# Patient Record
Sex: FEMALE | Race: WHITE | NOT HISPANIC OR LATINO
[De-identification: names, ages, dates, MRNs, and addresses within clinical notes are randomized per-mention and may not be internally consistent; named-entity substitution may affect disease eponyms.]

---

## 2022-08-12 ENCOUNTER — APPOINTMENT (OUTPATIENT)
Dept: OBGYN | Facility: CLINIC | Age: 42
End: 2022-08-12

## 2022-08-12 VITALS
HEIGHT: 68 IN | DIASTOLIC BLOOD PRESSURE: 81 MMHG | SYSTOLIC BLOOD PRESSURE: 117 MMHG | BODY MASS INDEX: 22.43 KG/M2 | RESPIRATION RATE: 14 BRPM | WEIGHT: 148 LBS | HEART RATE: 72 BPM

## 2022-08-12 DIAGNOSIS — Z00.00 ENCOUNTER FOR GENERAL ADULT MEDICAL EXAMINATION W/OUT ABNORMAL FINDINGS: ICD-10-CM

## 2022-08-12 PROCEDURE — 99396 PREV VISIT EST AGE 40-64: CPT

## 2022-08-12 NOTE — HISTORY OF PRESENT ILLNESS
[FreeTextEntry1] : Patient is a 41-year-old  3 para 3 last menstrual period August sixth 2022\par Patient presents for semiannual visit,,, has history of heavy periods,,, had undergone a endometrial biopsy back in January with negative findings

## 2022-08-12 NOTE — PLAN
[FreeTextEntry1] : Patient is a 41-year-old  3 para 3 last menstrual period 2022\par Patient presents for annual visit,,, states her periods are still heavy\par Patient underwent a endometrial biopsy 2022 with negative findings\par Physical exam reveals a well-developed well-nourished female no apparent distress,,, BMI 21\par Heart regular rhythm and rate, lungs clear, breast no mass nontender no skin change no nipple discharge no adenopathy, abdomen soft nontender no organomegaly.\par Evidence of slight rotation of the spine in the thoracic area,,, patient states that she is aware that she has been seen by orthopedic surgeon and has been told according the patient that this is from muscular contraction\par Pelvic exam shows normal female external genitalia, vagina no lesions, cervix appropriate size nontender, uterus anteverted normal size nontender, adnexa no mass nontender.\par Pap smear performed\par Prescription for breast mammogram sonogram given for follow-up since patient does have history of breast cysts\par Discussed extensively options to control her cycles,,, OCPs versus Depo-Provera versus NuvaRing versus IUD versus surgical options\par Patient is leaning towards the IUD and the Mirena,,, and will reconsider\par Patient advised that if she does decide to go forward with Mirena IUD to use Cytotec preprocedure for less discomfort during insertion

## 2022-08-12 NOTE — PHYSICAL EXAM
[Chaperone Present] : A chaperone was present in the examining room during all aspects of the physical examination [Appropriately responsive] : appropriately responsive [Alert] : alert [No Acute Distress] : no acute distress [No Lymphadenopathy] : no lymphadenopathy [Regular Rate Rhythm] : regular rate rhythm [No Murmurs] : no murmurs [Clear to Auscultation B/L] : clear to auscultation bilaterally [Soft] : soft [Non-tender] : non-tender [Non-distended] : non-distended [No HSM] : No HSM [No Lesions] : no lesions [No Mass] : no mass [Examination Of The Breasts] : a normal appearance [No Masses] : no breast masses were palpable [Labia Majora] : normal [Labia Minora] : normal [Normal] : normal [Anteversion] : anteverted [Uterine Adnexae] : normal [FreeTextEntry6] : No masses, nontender, no skin changes, no nipple discharge, no adenopathy [Tenderness] : nontender [Mass ___ cm] : no uterine mass was palpated

## 2022-08-18 LAB — HPV HIGH+LOW RISK DNA PNL CVX: NOT DETECTED

## 2022-08-22 ENCOUNTER — APPOINTMENT (OUTPATIENT)
Dept: PULMONOLOGY | Facility: CLINIC | Age: 42
End: 2022-08-22

## 2022-08-22 VITALS
OXYGEN SATURATION: 98 % | RESPIRATION RATE: 16 BRPM | WEIGHT: 148 LBS | HEIGHT: 68 IN | HEART RATE: 74 BPM | BODY MASS INDEX: 22.43 KG/M2

## 2022-08-22 VITALS — SYSTOLIC BLOOD PRESSURE: 102 MMHG | DIASTOLIC BLOOD PRESSURE: 68 MMHG

## 2022-08-22 DIAGNOSIS — K21.9 GASTRO-ESOPHAGEAL REFLUX DISEASE W/OUT ESOPHAGITIS: ICD-10-CM

## 2022-08-22 DIAGNOSIS — U07.1 COVID-19: ICD-10-CM

## 2022-08-22 DIAGNOSIS — R06.02 SHORTNESS OF BREATH: ICD-10-CM

## 2022-08-22 PROCEDURE — 99204 OFFICE O/P NEW MOD 45 MIN: CPT

## 2022-08-22 RX ORDER — LINACLOTIDE 72 UG/1
72 CAPSULE, GELATIN COATED ORAL
Qty: 30 | Refills: 0 | Status: ACTIVE | COMMUNITY
Start: 2021-07-22

## 2022-08-22 NOTE — DISCUSSION/SUMMARY
[FreeTextEntry1] : \par #1. Will schedule PFTs in near future to assess lung function \par #2. The patient does not appear to require chronic BD therapy at this time\par #3. Diet and exercise for weight loss\par #4. Albuterol as needed\par #5. CXR to evaluate SOBOE \par #6. Flonase nasal spray for post nasal drip as needed \par #7. PPI for GERD if needed\par #8. Allergy meds as needed\par #9. Pt s/p Covid infection; unvaccinated\par #10. F/u in 4-6 weeks with CXR and PFTs\par #11. Reviewed risks of exposure and symptoms of Covid-19 virus, including how the virus is spread and precautions to avoid evelyn virus. \par \par The patient expressed understanding and agreement with the above recommendations/plan and accepts responsibility to be compliant with recommended testing, therapies, and f/u visits.\par All relevant questions and concerns were addressed.

## 2022-08-22 NOTE — PHYSICAL EXAM
[No Acute Distress] : no acute distress [Well Nourished] : well nourished [Well Developed] : well developed [Normal Oropharynx] : normal oropharynx [Normal Appearance] : normal appearance [Supple] : supple [Normal Rate/Rhythm] : normal rate/rhythm [Normal S1, S2] : normal s1, s2 [No Murmurs] : no murmurs [No Resp Distress] : no resp distress [No Acc Muscle Use] : no acc muscle use [Normal Rhythm and Effort] : normal rhythm and effort [Clear to Auscultation Bilaterally] : clear to auscultation bilaterally [No Abnormalities] : no abnormalities [Benign] : benign [Not Tender] : not tender [Soft] : soft [No Clubbing] : no clubbing [No Edema] : no edema [No Focal Deficits] : no focal deficits [Oriented x3] : oriented x3

## 2022-08-22 NOTE — CONSULT LETTER
[Dear  ___] : Dear  [unfilled], [Consult Letter:] : I had the pleasure of evaluating your patient, [unfilled]. [Please see my note below.] : Please see my note below. [Consult Closing:] : Thank you very much for allowing me to participate in the care of this patient.  If you have any questions, please do not hesitate to contact me. [Sincerely,] : Sincerely, [FreeTextEntry3] : Josesito Lafleur MD, FCCP, D. ABSM\par Pulmonary and Sleep Medicine\par [facility] at [place]

## 2022-08-22 NOTE — REVIEW OF SYSTEMS
[Postnasal Drip] : postnasal drip [Chest Tightness] : chest tightness [SOB on Exertion] : sob on exertion [Negative] : Endocrine

## 2022-08-22 NOTE — HISTORY OF PRESENT ILLNESS
[Never] : never [TextBox_4] : Patient c/o SOBOE but is otherwise without associated respiratory complaints. \par Pt s/p Covid infection in 12/2021 when she developed fatigue, cough, h/a and resolved spontaneously. She did not require therapy.\par Pt with chronic fatigue and fibromyalgia.\par  [Initial Eval - Existing Diagnosis] : an initial evaluation of an existing diagnosis of [Intermittent] : intermittent [Dyspnea on Exertion] : dyspnea on exertion [Currently Experiencing] : The patient is currently experiencing symptoms. [None] : No associated symptoms are reported [Inhaled Short-Acting Beta-2 Agonists] : inhaled short-acting beta-2 agonists [Good Compliance] : good compliance with treatment [Good Tolerance] : good tolerance of treatment [Fair Symptom Control] : fair symptom control

## 2022-10-25 ENCOUNTER — APPOINTMENT (OUTPATIENT)
Dept: PULMONOLOGY | Facility: CLINIC | Age: 42
End: 2022-10-25

## 2022-10-25 VITALS — HEIGHT: 67 IN | BODY MASS INDEX: 22.6 KG/M2 | WEIGHT: 144 LBS

## 2022-10-25 VITALS
HEART RATE: 78 BPM | DIASTOLIC BLOOD PRESSURE: 76 MMHG | RESPIRATION RATE: 16 BRPM | OXYGEN SATURATION: 99 % | SYSTOLIC BLOOD PRESSURE: 116 MMHG

## 2022-10-25 PROCEDURE — 85018 HEMOGLOBIN: CPT | Mod: QW

## 2022-10-25 PROCEDURE — 94010 BREATHING CAPACITY TEST: CPT

## 2022-10-25 PROCEDURE — 99214 OFFICE O/P EST MOD 30 MIN: CPT | Mod: 25

## 2022-10-25 PROCEDURE — 94727 GAS DIL/WSHOT DETER LNG VOL: CPT

## 2022-10-25 PROCEDURE — 94729 DIFFUSING CAPACITY: CPT

## 2022-10-25 NOTE — CONSULT LETTER
[Dear  ___] : Dear  [unfilled], [Consult Letter:] : I had the pleasure of evaluating your patient, [unfilled]. [Please see my note below.] : Please see my note below. [Consult Closing:] : Thank you very much for allowing me to participate in the care of this patient.  If you have any questions, please do not hesitate to contact me. [Sincerely,] : Sincerely, [FreeTextEntry3] : Josesito Lafleur MD, FCCP, D. ABSM\par Pulmonary and Sleep Medicine\par [facility] at [place]

## 2022-10-25 NOTE — DISCUSSION/SUMMARY
[FreeTextEntry1] : \par #1. PFTs performed today are essentially normal.\par #2. The patient does not appear to require chronic BD therapy at this time\par #3. SOBOE is likely at least somewhat related to weight or deconditioning given PFT findings\par #4. Diet and exercise for weight loss \par #5. CXR to evaluate SOBOE from 9/14/22 was clear\par #6. Flonase nasal spray for post nasal drip as needed \par #7. PPI for GERD if needed\par #8. Allergy meds as needed\par #9. Pt s/p Covid infection; unvaccinated\par #10. Methacholine challenge test to further evaluate for possible asthma \par #11. CBC with eos and IgE levels to further evaluate for asthma\par #12. F/u in 2 months with MCT and labs\par #13. ENT f/u \par #14. Reviewed risks of exposure and symptoms of Covid-19 virus, including how the virus is spread and precautions to avoid evelyn virus. \par \par The patient expressed understanding and agreement with the above recommendations/plan and accepts responsibility to be compliant with recommended testing, therapies, and f/u visits.\par All relevant questions and concerns were addressed.

## 2022-10-25 NOTE — HISTORY OF PRESENT ILLNESS
[Never] : never [Intermittent] : intermittent [Dyspnea on Exertion] : dyspnea on exertion [Currently Experiencing] : The patient is currently experiencing symptoms. [None] : No associated symptoms are reported [Inhaled Short-Acting Beta-2 Agonists] : inhaled short-acting beta-2 agonists [Good Compliance] : good compliance with treatment [Good Tolerance] : good tolerance of treatment [Fair Symptom Control] : fair symptom control [Follow-Up - Routine Clinic] : a routine clinic follow-up of [TextBox_4] : Patient c/o SOBOE but is otherwise without associated respiratory complaints. \par Pt s/p Covid infection in 12/2021 when she developed fatigue, cough, h/a and resolved spontaneously. She did not require therapy.\par Pt with chronic fatigue and fibromyalgia.\par She reports asthma as a child, responsive to Albuterol inhaler therapy. She uses her Albuterol now as well with some improvement but without resolution of symptoms though does use prior to exercise at times.\par Seen by ENT but w/u non-diagnostic; concern for possible anxiety as contributing factor

## 2022-12-28 ENCOUNTER — APPOINTMENT (OUTPATIENT)
Dept: PULMONOLOGY | Facility: CLINIC | Age: 42
End: 2022-12-28

## 2023-04-03 ENCOUNTER — APPOINTMENT (OUTPATIENT)
Dept: PULMONOLOGY | Facility: CLINIC | Age: 43
End: 2023-04-03
Payer: COMMERCIAL

## 2023-04-03 VITALS
WEIGHT: 140 LBS | SYSTOLIC BLOOD PRESSURE: 120 MMHG | OXYGEN SATURATION: 99 % | DIASTOLIC BLOOD PRESSURE: 80 MMHG | BODY MASS INDEX: 21.22 KG/M2 | HEART RATE: 88 BPM | RESPIRATION RATE: 16 BRPM | HEIGHT: 68 IN

## 2023-04-03 PROCEDURE — 99214 OFFICE O/P EST MOD 30 MIN: CPT

## 2023-04-03 RX ORDER — AZITHROMYCIN 500 MG/1
500 TABLET, FILM COATED ORAL
Qty: 7 | Refills: 0 | Status: DISCONTINUED | COMMUNITY
Start: 2022-10-17 | End: 2023-04-03

## 2023-04-03 RX ORDER — LEVOCETIRIZINE DIHYDROCHLORIDE 5 MG/1
5 TABLET ORAL
Qty: 21 | Refills: 0 | Status: DISCONTINUED | COMMUNITY
Start: 2023-03-29

## 2023-04-03 RX ORDER — OMEPRAZOLE 20 MG/1
20 CAPSULE, DELAYED RELEASE ORAL
Qty: 30 | Refills: 0 | Status: DISCONTINUED | COMMUNITY
Start: 2022-03-21 | End: 2023-04-03

## 2023-04-03 RX ORDER — FLUTICASONE PROPIONATE 50 UG/1
50 SPRAY, METERED NASAL
Qty: 16 | Refills: 0 | Status: DISCONTINUED | COMMUNITY
Start: 2022-03-21 | End: 2023-04-03

## 2023-04-03 RX ORDER — AMITRIPTYLINE HYDROCHLORIDE 10 MG/1
10 TABLET, FILM COATED ORAL
Qty: 30 | Refills: 0 | Status: DISCONTINUED | COMMUNITY
Start: 2023-01-08

## 2023-04-03 NOTE — HISTORY OF PRESENT ILLNESS
[Follow-Up - Routine Clinic] : a routine clinic follow-up of [Intermittent] : intermittent [Dyspnea on Exertion] : dyspnea on exertion [Currently Experiencing] : The patient is currently experiencing symptoms. [None] : No associated symptoms are reported [Inhaled Short-Acting Beta-2 Agonists] : inhaled short-acting beta-2 agonists [Good Compliance] : good compliance with treatment [Good Tolerance] : good tolerance of treatment [Fair Symptom Control] : fair symptom control [TextBox_4] : Patient c/o SOBOE but is otherwise without associated respiratory complaints. \par Pt s/p Covid infection in 12/2021 when she developed fatigue, cough, h/a and resolved spontaneously. She did not require therapy.\par Pt with chronic fatigue and fibromyalgia.\par She reports asthma as a child, responsive to Albuterol inhaler therapy. She uses her Albuterol now as well with some improvement but without resolution of symptoms though does use prior to exercise at times.\par Seen by ENT but w/u non-diagnostic; concern for possible anxiety as contributing factor.\par

## 2023-04-03 NOTE — CONSULT LETTER
[Dear  ___] : Dear  [unfilled], [Consult Letter:] : I had the pleasure of evaluating your patient, [unfilled]. [Please see my note below.] : Please see my note below. [Consult Closing:] : Thank you very much for allowing me to participate in the care of this patient.  If you have any questions, please do not hesitate to contact me. [Sincerely,] : Sincerely, [FreeTextEntry3] : Josesito Lafleur MD, FCCP, D. ABSM\par Pulmonary and Sleep Medicine\par [facility] at [place]

## 2023-04-03 NOTE — DISCUSSION/SUMMARY
[FreeTextEntry1] : \par #1. PFTs performed previously essentially normal.\par #2. The patient does not appear to require chronic BD therapy at this time\par #3. SOBOE is likely at least somewhat related to weight or deconditioning given PFT findings\par #4. Diet and exercise for weight loss \par #5. CXR to evaluate SOBOE from 9/14/22 was clear\par #6. Flonase nasal spray for post nasal drip as needed \par #7. PPI for GERD if needed\par #8. Allergy meds as needed\par #9. Pt s/p Covid infection; unvaccinated\par #10. Methacholine challenge test to further evaluate for possible asthma was negative\par #11. CBC with eos and IgE levels to further evaluate for asthma was not done so ordered again\par #12. Medrol dose meron for recent sinusitis; ENT f/u\par #13. Trial of Breo 100 given exertional SOB with exercise though PFTs and MCT were negative; she reports some improvement with Albuterol inhaler as needed\par #14. Reviewed risks of exposure and symptoms of Covid-19 virus, including how the virus is spread and precautions to avoid evelyn virus. \par \par The patient expressed understanding and agreement with the above recommendations/plan and accepts responsibility to be compliant with recommended testing, therapies, and f/u visits.\par All relevant questions and concerns were addressed.

## 2023-07-06 ENCOUNTER — APPOINTMENT (OUTPATIENT)
Dept: PULMONOLOGY | Facility: CLINIC | Age: 43
End: 2023-07-06
Payer: COMMERCIAL

## 2023-07-06 VITALS
SYSTOLIC BLOOD PRESSURE: 122 MMHG | DIASTOLIC BLOOD PRESSURE: 78 MMHG | HEIGHT: 68 IN | BODY MASS INDEX: 21.22 KG/M2 | HEART RATE: 87 BPM | RESPIRATION RATE: 16 BRPM | OXYGEN SATURATION: 98 % | WEIGHT: 140 LBS

## 2023-07-06 DIAGNOSIS — J45.990 EXERCISE INDUCED BRONCHOSPASM: ICD-10-CM

## 2023-07-06 DIAGNOSIS — R06.02 SHORTNESS OF BREATH: ICD-10-CM

## 2023-07-06 DIAGNOSIS — R09.82 POSTNASAL DRIP: ICD-10-CM

## 2023-07-06 DIAGNOSIS — R05.9 COUGH, UNSPECIFIED: ICD-10-CM

## 2023-07-06 PROCEDURE — 99214 OFFICE O/P EST MOD 30 MIN: CPT

## 2023-07-06 RX ORDER — METHYLPREDNISOLONE 4 MG/1
4 TABLET ORAL
Qty: 1 | Refills: 0 | Status: DISCONTINUED | COMMUNITY
Start: 2023-04-03 | End: 2023-07-06

## 2023-07-06 RX ORDER — AMOXICILLIN AND CLAVULANATE POTASSIUM 875; 125 MG/1; MG/1
875-125 TABLET, COATED ORAL
Qty: 20 | Refills: 0 | Status: DISCONTINUED | COMMUNITY
Start: 2022-10-08 | End: 2023-07-06

## 2023-07-06 NOTE — CONSULT LETTER
[Dear  ___] : Dear  [unfilled], [Consult Letter:] : I had the pleasure of evaluating your patient, [unfilled]. [Please see my note below.] : Please see my note below. [Consult Closing:] : Thank you very much for allowing me to participate in the care of this patient.  If you have any questions, please do not hesitate to contact me. [Sincerely,] : Sincerely, [FreeTextEntry3] : Josesito Lafleur MD, FCCP, D. ABSM\par Pulmonary and Sleep Medicine\par [facility] at [place]

## 2023-07-06 NOTE — DISCUSSION/SUMMARY
[FreeTextEntry1] : \par #1. PFTs performed previously essentially normal.\par #2. The patient does not appear to require chronic BD therapy at this time\par #3. SOBOE is likely at least somewhat related to weight or deconditioning given PFT findings\par #4. Diet and exercise for weight loss \par #5. CXR to evaluate SOBOE from 9/14/22 was clear\par #6. Flonase nasal spray for post nasal drip as needed \par #7. PPI for GERD if needed\par #8. Allergy meds as needed\par #9. Pt s/p Covid infection\par #10. Methacholine challenge test to further evaluate for possible asthma was negative\par #11. CBC with eos and IgE levels to further evaluate for asthma was not done so ordered again\par #12. Medrol dose meron for recent sinusitis in the past; ENT f/u if nasal symptoms persist\par #13. Doing well with Breo 100 given exertional SOB with exercise though PFTs and MCT were negative; she reports some improvement and rarely uses the Albuterol inhaler as needed\par \par The patient expressed understanding and agreement with the above recommendations/plan and accepts responsibility to be compliant with recommended testing, therapies, and f/u visits.\par All relevant questions and concerns were addressed.

## 2023-08-18 ENCOUNTER — APPOINTMENT (OUTPATIENT)
Dept: OBGYN | Facility: CLINIC | Age: 43
End: 2023-08-18
Payer: COMMERCIAL

## 2023-08-18 VITALS
HEART RATE: 73 BPM | SYSTOLIC BLOOD PRESSURE: 127 MMHG | DIASTOLIC BLOOD PRESSURE: 86 MMHG | WEIGHT: 150 LBS | BODY MASS INDEX: 22.81 KG/M2

## 2023-08-18 DIAGNOSIS — R92.2 INCONCLUSIVE MAMMOGRAM: ICD-10-CM

## 2023-08-18 DIAGNOSIS — Z01.419 ENCOUNTER FOR GYNECOLOGICAL EXAMINATION (GENERAL) (ROUTINE) W/OUT ABNORMAL FINDINGS: ICD-10-CM

## 2023-08-18 DIAGNOSIS — Z12.4 ENCOUNTER FOR SCREENING FOR MALIGNANT NEOPLASM OF CERVIX: ICD-10-CM

## 2023-08-18 DIAGNOSIS — Z12.39 ENCOUNTER FOR OTHER SCREENING FOR MALIGNANT NEOPLASM OF BREAST: ICD-10-CM

## 2023-08-18 DIAGNOSIS — Z12.31 ENCOUNTER FOR SCREENING MAMMOGRAM FOR MALIGNANT NEOPLASM OF BREAST: ICD-10-CM

## 2023-08-18 DIAGNOSIS — Z11.51 ENCOUNTER FOR SCREENING FOR HUMAN PAPILLOMAVIRUS (HPV): ICD-10-CM

## 2023-08-18 PROCEDURE — 99396 PREV VISIT EST AGE 40-64: CPT

## 2023-08-18 RX ORDER — NORETHINDRONE ACETATE AND ETHINYL ESTRADIOL, ETHINYL ESTRADIOL AND FERROUS FUMARATE 1MG-10(24)
1 MG-10 MCG / KIT ORAL
Qty: 84 | Refills: 3 | Status: ACTIVE | COMMUNITY
Start: 2023-08-18 | End: 1900-01-01

## 2023-08-18 NOTE — PLAN
[FreeTextEntry1] : Patient is a 42-year-old  3 para 3 last menstrual 2023 Patient presents for annual visit,, denies any complaints Physical exam reveals a well-developed well-nourished female no apparent distress,, BMI 22 Heart regular rhythm and rate, lungs clear, breast no mass nontender no skin change or nipple discharge no adenopathy, abdomen soft nontender no organomegaly. Pelvic exam shows normal female external genitalia, vagina no lesions, cervix appropriate size nontender, uterus anteverted normal size nontender, adnexa no mass nontender. Pap smear was performed Patient will be given a prescription for breast mammogram and sonogram Patient also will have renewal on her birth control pills Follow-up 6 months Essentially benign joint exam  Georgiana was present for the entire assessment and examination of this patient

## 2023-08-18 NOTE — HISTORY OF PRESENT ILLNESS
[FreeTextEntry1] : Patient is a 42-year-old  3 para 3 last menstrual 2023 Patient presents for annual visit,, and renewal her birth control pills

## 2023-08-24 RX ORDER — NORETHINDRONE ACETATE AND ETHINYL ESTRADIOL, ETHINYL ESTRADIOL AND FERROUS FUMARATE 1MG-10(24)
1 MG-10 MCG / KIT ORAL
Qty: 84 | Refills: 3 | Status: ACTIVE | COMMUNITY
Start: 2022-10-11 | End: 1900-01-01

## 2023-08-25 LAB — HPV HIGH+LOW RISK DNA PNL CVX: NOT DETECTED

## 2023-11-15 ENCOUNTER — APPOINTMENT (OUTPATIENT)
Dept: PULMONOLOGY | Facility: CLINIC | Age: 43
End: 2023-11-15

## 2024-01-15 RX ORDER — FLUTICASONE FUROATE AND VILANTEROL TRIFENATATE 100; 25 UG/1; UG/1
100-25 POWDER RESPIRATORY (INHALATION)
Qty: 1 | Refills: 3 | Status: ACTIVE | COMMUNITY
Start: 2023-04-03 | End: 1900-01-01

## 2024-02-21 ENCOUNTER — NON-APPOINTMENT (OUTPATIENT)
Age: 44
End: 2024-02-21

## 2024-03-29 ENCOUNTER — NON-APPOINTMENT (OUTPATIENT)
Age: 44
End: 2024-03-29

## 2024-03-29 ENCOUNTER — RESULT REVIEW (OUTPATIENT)
Age: 44
End: 2024-03-29

## 2024-06-12 ENCOUNTER — APPOINTMENT (OUTPATIENT)
Dept: BREAST CENTER | Facility: CLINIC | Age: 44
End: 2024-06-12
Payer: COMMERCIAL

## 2024-06-12 VITALS
DIASTOLIC BLOOD PRESSURE: 77 MMHG | HEIGHT: 68 IN | HEART RATE: 76 BPM | SYSTOLIC BLOOD PRESSURE: 113 MMHG | WEIGHT: 136 LBS | BODY MASS INDEX: 20.61 KG/M2

## 2024-06-12 DIAGNOSIS — R92.8 OTHER ABNORMAL AND INCONCLUSIVE FINDINGS ON DIAGNOSTIC IMAGING OF BREAST: ICD-10-CM

## 2024-06-12 DIAGNOSIS — Z78.9 OTHER SPECIFIED HEALTH STATUS: ICD-10-CM

## 2024-06-12 DIAGNOSIS — R89.7 ABNORMAL HISTOLOGICAL FINDINGS IN SPECIMENS FROM OTHER ORGANS, SYSTEMS AND TISSUES: ICD-10-CM

## 2024-06-12 PROCEDURE — 99244 OFF/OP CNSLTJ NEW/EST MOD 40: CPT

## 2024-06-12 RX ORDER — ALBUTEROL SULFATE 90 UG/1
108 (90 BASE) INHALANT RESPIRATORY (INHALATION)
Qty: 1 | Refills: 2 | Status: DISCONTINUED | COMMUNITY
Start: 2021-09-02 | End: 2024-06-12

## 2024-06-12 NOTE — PHYSICAL EXAM
[Normocephalic] : normocephalic [EOMI] : extra ocular movement intact [Sclera nonicteric] : sclera nonicteric [Supple] : supple [No Supraclavicular Adenopathy] : no supraclavicular adenopathy [No Cervical Adenopathy] : no cervical adenopathy [No Thyromegaly] : no thyromegaly [Clear to Auscultation Bilat] : clear to auscultation bilaterally [Normal Sinus Rhythm] : normal sinus rhythm [Normal S1, S2] : normal S1 and S2 [Examined in the supine and seated position] : examined in the supine and seated position [Bra Size: ___] : Bra Size: [unfilled] [No dominant masses] : no dominant masses in right breast  [No Nipple Retraction] : no left nipple retraction [No Nipple Discharge] : no left nipple discharge [No Axillary Lymphadenopathy] : no left axillary lymphadenopathy [Soft] : abdomen soft [Not Tender] : non-tender [No Palpable Masses] : no abdominal mass palpated [de-identified] : 2 cm smooth defined mobile mass 12N4

## 2024-06-12 NOTE — PROCEDURE
[FreeTextEntry1] : Left breast ultrasound [FreeTextEntry2] : Assess palpable finding [FreeTextEntry3] : The left 12N4 breast shows an oval [id number] mm hypoechoic mass with clip.

## 2024-06-12 NOTE — CONSULT LETTER
[Dear  ___] : Dear  [unfilled], [Consult Letter:] : I had the pleasure of evaluating your patient, [unfilled]. [Sincerely,] : Sincerely, [DrAlecia  ___] : Dr. GLEZ

## 2024-06-12 NOTE — DATA REVIEWED
[FreeTextEntry1] : Mammogram screening bilateral 2/21/2024: Breasts are heterogenously dense. No suspicious masses , architectural distortion, or significant calcifications are detected.  There is a tissue marker in the superior aspect of the left breast from prior benign percutaneous biopsy.  It is associated with an oval mass.  The area of mass formation appears larger on the current study.  Bilateral Breast Ultrasound 2/21/2024: In the 12:00 region of the left breast , 5 cm FN there is a [id number] heterogeneous mass .  This is in the region previously biopsied and was found to be a fibroepithelial lesion.  Surgical consultation was advised after those patholgoy results were received.  This structure has enlarged since the prior study and ultrasound - guided biopsy is recommended.   Ultrasound guided core biopsy of left breast 3/29/2024: left breast at 12:00, fibroepithelial lesion . Pathology findings are similar to previous biopsy of this lesion 10/5/22) and are concordant with imaging findings.    Pathology 3/29/2024: left breast 12:00 biopsy:  Fibroepithelial lesion .  Differential diagnosis include fibroadenoma vs benign phyllodes tumor.  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  Complete excision of the lesion is recommended. ([place] X shaped biopsy clip)  (images from [facility] were uploaded/reviewed on PACS - discs returned to patient)

## 2024-06-12 NOTE — PAST MEDICAL HISTORY
[Menstruating] : The patient is menstruating [Menarche Age ____] : age at menarche was [unfilled] [Definite ___ (Date)] : the last menstrual period was [unfilled] [Total Preg ___] : G[unfilled] [Live Births ___] : P[unfilled]  [Age At Live Birth ___] : Age at live birth: [unfilled] [History of Hormone Replacement Treatment] : has no history of hormone replacement treatment [FreeTextEntry2] : 2 sons, daughter [FreeTextEntry7] : yes [FreeTextEntry8] : 2/3 months for each.

## 2024-06-12 NOTE — HISTORY OF PRESENT ILLNESS
[FreeTextEntry1] : This is a 43 year old  female who was found to have a left breast mass at 12N5 on routine imaging in 2/22. It increased on follow-up and a core biopsy was done in 10/22 that showed a fibroepithelial lesion.  She saw a surgeon who advised that it be monitored.  She had breast imaging in 2/24 that showed the mass to have increased further.  Another core biopsy was done and again shows a fibroepithelial lesion. She was advised to have it removed.  She is able to feel the mass.   Risk assessment:  TCv8 5 year= 0.9%  Lifetime= 11.8%                                Diane model 5 year= 1.3%  Lifetime= 12.9%

## 2024-07-01 ENCOUNTER — OUTPATIENT (OUTPATIENT)
Dept: OUTPATIENT SERVICES | Facility: HOSPITAL | Age: 44
LOS: 1 days | End: 2024-07-01
Payer: COMMERCIAL

## 2024-07-01 DIAGNOSIS — Z01.818 ENCOUNTER FOR OTHER PREPROCEDURAL EXAMINATION: ICD-10-CM

## 2024-07-01 DIAGNOSIS — R92.8 OTHER ABNORMAL AND INCONCLUSIVE FINDINGS ON DIAGNOSTIC IMAGING OF BREAST: ICD-10-CM

## 2024-07-01 DIAGNOSIS — Z98.890 OTHER SPECIFIED POSTPROCEDURAL STATES: Chronic | ICD-10-CM

## 2024-07-01 LAB
ABO RH CONFIRMATION: SIGNIFICANT CHANGE UP
ALBUMIN SERPL ELPH-MCNC: 3.9 G/DL — SIGNIFICANT CHANGE UP (ref 3.3–5)
ALP SERPL-CCNC: 58 U/L — SIGNIFICANT CHANGE UP (ref 40–120)
ALT FLD-CCNC: 28 U/L — SIGNIFICANT CHANGE UP (ref 12–78)
ANION GAP SERPL CALC-SCNC: 2 MMOL/L — LOW (ref 5–17)
APTT BLD: 25.5 SEC — SIGNIFICANT CHANGE UP (ref 24.5–35.6)
AST SERPL-CCNC: 22 U/L — SIGNIFICANT CHANGE UP (ref 15–37)
BASOPHILS # BLD AUTO: 0.04 K/UL — SIGNIFICANT CHANGE UP (ref 0–0.2)
BASOPHILS NFR BLD AUTO: 0.6 % — SIGNIFICANT CHANGE UP (ref 0–2)
BILIRUB SERPL-MCNC: 1.4 MG/DL — HIGH (ref 0.2–1.2)
BUN SERPL-MCNC: 12 MG/DL — SIGNIFICANT CHANGE UP (ref 7–23)
CALCIUM SERPL-MCNC: 9.2 MG/DL — SIGNIFICANT CHANGE UP (ref 8.5–10.1)
CHLORIDE SERPL-SCNC: 111 MMOL/L — HIGH (ref 96–108)
CO2 SERPL-SCNC: 25 MMOL/L — SIGNIFICANT CHANGE UP (ref 22–31)
CREAT SERPL-MCNC: 0.88 MG/DL — SIGNIFICANT CHANGE UP (ref 0.5–1.3)
EGFR: 83 ML/MIN/1.73M2 — SIGNIFICANT CHANGE UP
EOSINOPHIL # BLD AUTO: 0.07 K/UL — SIGNIFICANT CHANGE UP (ref 0–0.5)
EOSINOPHIL NFR BLD AUTO: 1 % — SIGNIFICANT CHANGE UP (ref 0–6)
GLUCOSE SERPL-MCNC: 96 MG/DL — SIGNIFICANT CHANGE UP (ref 70–99)
HCT VFR BLD CALC: 41.4 % — SIGNIFICANT CHANGE UP (ref 34.5–45)
HGB BLD-MCNC: 14.2 G/DL — SIGNIFICANT CHANGE UP (ref 11.5–15.5)
IMM GRANULOCYTES NFR BLD AUTO: 0.1 % — SIGNIFICANT CHANGE UP (ref 0–0.9)
INR BLD: 0.87 RATIO — SIGNIFICANT CHANGE UP (ref 0.85–1.18)
LYMPHOCYTES # BLD AUTO: 2.13 K/UL — SIGNIFICANT CHANGE UP (ref 1–3.3)
LYMPHOCYTES # BLD AUTO: 31.3 % — SIGNIFICANT CHANGE UP (ref 13–44)
MCHC RBC-ENTMCNC: 32.3 PG — SIGNIFICANT CHANGE UP (ref 27–34)
MCHC RBC-ENTMCNC: 34.3 GM/DL — SIGNIFICANT CHANGE UP (ref 32–36)
MCV RBC AUTO: 94.1 FL — SIGNIFICANT CHANGE UP (ref 80–100)
MONOCYTES # BLD AUTO: 0.72 K/UL — SIGNIFICANT CHANGE UP (ref 0–0.9)
MONOCYTES NFR BLD AUTO: 10.6 % — SIGNIFICANT CHANGE UP (ref 2–14)
NEUTROPHILS # BLD AUTO: 3.84 K/UL — SIGNIFICANT CHANGE UP (ref 1.8–7.4)
NEUTROPHILS NFR BLD AUTO: 56.4 % — SIGNIFICANT CHANGE UP (ref 43–77)
PLATELET # BLD AUTO: 292 K/UL — SIGNIFICANT CHANGE UP (ref 150–400)
POTASSIUM SERPL-MCNC: 4.2 MMOL/L — SIGNIFICANT CHANGE UP (ref 3.5–5.3)
POTASSIUM SERPL-SCNC: 4.2 MMOL/L — SIGNIFICANT CHANGE UP (ref 3.5–5.3)
PROT SERPL-MCNC: 7.2 GM/DL — SIGNIFICANT CHANGE UP (ref 6–8.3)
PROTHROM AB SERPL-ACNC: 9.9 SEC — SIGNIFICANT CHANGE UP (ref 9.5–13)
RBC # BLD: 4.4 M/UL — SIGNIFICANT CHANGE UP (ref 3.8–5.2)
RBC # FLD: 12.6 % — SIGNIFICANT CHANGE UP (ref 10.3–14.5)
SODIUM SERPL-SCNC: 138 MMOL/L — SIGNIFICANT CHANGE UP (ref 135–145)
WBC # BLD: 6.81 K/UL — SIGNIFICANT CHANGE UP (ref 3.8–10.5)
WBC # FLD AUTO: 6.81 K/UL — SIGNIFICANT CHANGE UP (ref 3.8–10.5)

## 2024-07-01 PROCEDURE — 85730 THROMBOPLASTIN TIME PARTIAL: CPT

## 2024-07-01 PROCEDURE — 36415 COLL VENOUS BLD VENIPUNCTURE: CPT

## 2024-07-01 PROCEDURE — 71046 X-RAY EXAM CHEST 2 VIEWS: CPT

## 2024-07-01 PROCEDURE — 85025 COMPLETE CBC W/AUTO DIFF WBC: CPT

## 2024-07-01 PROCEDURE — 93010 ELECTROCARDIOGRAM REPORT: CPT

## 2024-07-01 PROCEDURE — 85610 PROTHROMBIN TIME: CPT

## 2024-07-01 PROCEDURE — 86850 RBC ANTIBODY SCREEN: CPT

## 2024-07-01 PROCEDURE — 93005 ELECTROCARDIOGRAM TRACING: CPT

## 2024-07-01 PROCEDURE — 86900 BLOOD TYPING SEROLOGIC ABO: CPT

## 2024-07-01 PROCEDURE — 86901 BLOOD TYPING SEROLOGIC RH(D): CPT

## 2024-07-01 PROCEDURE — 80053 COMPREHEN METABOLIC PANEL: CPT

## 2024-07-01 PROCEDURE — 71046 X-RAY EXAM CHEST 2 VIEWS: CPT | Mod: 26

## 2024-07-02 DIAGNOSIS — Z01.818 ENCOUNTER FOR OTHER PREPROCEDURAL EXAMINATION: ICD-10-CM

## 2024-07-02 DIAGNOSIS — R92.8 OTHER ABNORMAL AND INCONCLUSIVE FINDINGS ON DIAGNOSTIC IMAGING OF BREAST: ICD-10-CM

## 2024-07-03 ENCOUNTER — OUTPATIENT (OUTPATIENT)
Dept: OUTPATIENT SERVICES | Facility: HOSPITAL | Age: 44
LOS: 1 days | End: 2024-07-03
Payer: COMMERCIAL

## 2024-07-03 ENCOUNTER — RESULT REVIEW (OUTPATIENT)
Age: 44
End: 2024-07-03

## 2024-07-03 DIAGNOSIS — R92.8 OTHER ABNORMAL AND INCONCLUSIVE FINDINGS ON DIAGNOSTIC IMAGING OF BREAST: ICD-10-CM

## 2024-07-03 DIAGNOSIS — R89.7 ABNORMAL HISTOLOGICAL FINDINGS IN SPECIMENS FROM OTHER ORGANS, SYSTEMS AND TISSUES: ICD-10-CM

## 2024-07-03 DIAGNOSIS — Z98.890 OTHER SPECIFIED POSTPROCEDURAL STATES: Chronic | ICD-10-CM

## 2024-07-03 PROBLEM — N63.20 UNSPECIFIED LUMP IN THE LEFT BREAST, UNSPECIFIED QUADRANT: Chronic | Status: ACTIVE | Noted: 2024-07-01

## 2024-07-03 PROCEDURE — 88321 CONSLTJ&REPRT SLD PREP ELSWR: CPT

## 2024-07-04 DIAGNOSIS — R92.8 OTHER ABNORMAL AND INCONCLUSIVE FINDINGS ON DIAGNOSTIC IMAGING OF BREAST: ICD-10-CM

## 2024-07-04 DIAGNOSIS — R89.7 ABNORMAL HISTOLOGICAL FINDINGS IN SPECIMENS FROM OTHER ORGANS, SYSTEMS AND TISSUES: ICD-10-CM

## 2024-07-05 LAB — SURGICAL PATHOLOGY STUDY: SIGNIFICANT CHANGE UP

## 2024-07-11 ENCOUNTER — RESULT REVIEW (OUTPATIENT)
Age: 44
End: 2024-07-11

## 2024-07-11 ENCOUNTER — TRANSCRIPTION ENCOUNTER (OUTPATIENT)
Age: 44
End: 2024-07-11

## 2024-07-11 ENCOUNTER — APPOINTMENT (OUTPATIENT)
Dept: BREAST CENTER | Facility: HOSPITAL | Age: 44
End: 2024-07-11

## 2024-07-11 ENCOUNTER — OUTPATIENT (OUTPATIENT)
Dept: INPATIENT UNIT | Facility: HOSPITAL | Age: 44
LOS: 1 days | Discharge: HOME | End: 2024-07-11
Payer: COMMERCIAL

## 2024-07-11 VITALS
SYSTOLIC BLOOD PRESSURE: 109 MMHG | OXYGEN SATURATION: 98 % | HEART RATE: 65 BPM | DIASTOLIC BLOOD PRESSURE: 73 MMHG | RESPIRATION RATE: 14 BRPM | TEMPERATURE: 98 F

## 2024-07-11 VITALS
HEIGHT: 68 IN | OXYGEN SATURATION: 100 % | RESPIRATION RATE: 16 BRPM | SYSTOLIC BLOOD PRESSURE: 119 MMHG | WEIGHT: 134.92 LBS | DIASTOLIC BLOOD PRESSURE: 75 MMHG | HEART RATE: 66 BPM | TEMPERATURE: 98 F

## 2024-07-11 DIAGNOSIS — D24.2 BENIGN NEOPLASM OF LEFT BREAST: ICD-10-CM

## 2024-07-11 DIAGNOSIS — N63.20 UNSPECIFIED LUMP IN THE LEFT BREAST, UNSPECIFIED QUADRANT: ICD-10-CM

## 2024-07-11 DIAGNOSIS — Z88.1 ALLERGY STATUS TO OTHER ANTIBIOTIC AGENTS: ICD-10-CM

## 2024-07-11 DIAGNOSIS — Z88.5 ALLERGY STATUS TO NARCOTIC AGENT: ICD-10-CM

## 2024-07-11 DIAGNOSIS — Z98.890 OTHER SPECIFIED POSTPROCEDURAL STATES: Chronic | ICD-10-CM

## 2024-07-11 DIAGNOSIS — R89.7 ABNORMAL HISTOLOGICAL FINDINGS IN SPECIMENS FROM OTHER ORGANS, SYSTEMS AND TISSUES: ICD-10-CM

## 2024-07-11 DIAGNOSIS — J45.990 EXERCISE INDUCED BRONCHOSPASM: ICD-10-CM

## 2024-07-11 DIAGNOSIS — R92.8 OTHER ABNORMAL AND INCONCLUSIVE FINDINGS ON DIAGNOSTIC IMAGING OF BREAST: ICD-10-CM

## 2024-07-11 DIAGNOSIS — F41.9 ANXIETY DISORDER, UNSPECIFIED: ICD-10-CM

## 2024-07-11 LAB
BLD GP AB SCN SERPL QL: SIGNIFICANT CHANGE UP
HCG UR QL: NEGATIVE — SIGNIFICANT CHANGE UP

## 2024-07-11 PROCEDURE — 88307 TISSUE EXAM BY PATHOLOGIST: CPT | Mod: 26

## 2024-07-11 PROCEDURE — 86850 RBC ANTIBODY SCREEN: CPT

## 2024-07-11 PROCEDURE — 86901 BLOOD TYPING SEROLOGIC RH(D): CPT

## 2024-07-11 PROCEDURE — 88307 TISSUE EXAM BY PATHOLOGIST: CPT

## 2024-07-11 PROCEDURE — 86900 BLOOD TYPING SEROLOGIC ABO: CPT

## 2024-07-11 PROCEDURE — 36415 COLL VENOUS BLD VENIPUNCTURE: CPT

## 2024-07-11 PROCEDURE — 81025 URINE PREGNANCY TEST: CPT

## 2024-07-11 PROCEDURE — 19120 REMOVAL OF BREAST LESION: CPT

## 2024-07-11 PROCEDURE — 76098 X-RAY EXAM SURGICAL SPECIMEN: CPT

## 2024-07-11 RX ORDER — DEXTROSE MONOHYDRATE AND SODIUM CHLORIDE 5; .3 G/100ML; G/100ML
1000 INJECTION, SOLUTION INTRAVENOUS
Refills: 0 | Status: DISCONTINUED | OUTPATIENT
Start: 2024-07-11 | End: 2024-07-11

## 2024-07-11 RX ORDER — ONDANSETRON HYDROCHLORIDE 2 MG/ML
4 INJECTION INTRAMUSCULAR; INTRAVENOUS ONCE
Refills: 0 | Status: DISCONTINUED | OUTPATIENT
Start: 2024-07-11 | End: 2024-07-11

## 2024-07-11 RX ORDER — OXYCODONE HYDROCHLORIDE 100 MG/5ML
5 SOLUTION ORAL ONCE
Refills: 0 | Status: DISCONTINUED | OUTPATIENT
Start: 2024-07-11 | End: 2024-07-11

## 2024-07-11 RX ORDER — FENTANYL CITRATE 50 UG/ML
25 INJECTION, SOLUTION INTRAMUSCULAR; INTRAVENOUS
Refills: 0 | Status: DISCONTINUED | OUTPATIENT
Start: 2024-07-11 | End: 2024-07-11

## 2024-07-11 RX ORDER — LINACLOTIDE 290 UG/1
1 CAPSULE, GELATIN COATED ORAL
Refills: 0 | DISCHARGE

## 2024-07-11 RX ORDER — NORETHINDRONE-ETHIN. ESTRADIOL 1 MG-35MCG
1 TABLET ORAL
Refills: 0 | DISCHARGE

## 2024-07-12 ENCOUNTER — NON-APPOINTMENT (OUTPATIENT)
Age: 44
End: 2024-07-12

## 2024-07-15 LAB — SURGICAL PATHOLOGY STUDY: SIGNIFICANT CHANGE UP

## 2024-07-17 ENCOUNTER — APPOINTMENT (OUTPATIENT)
Dept: BREAST CENTER | Facility: CLINIC | Age: 44
End: 2024-07-17
Payer: COMMERCIAL

## 2024-07-17 VITALS
SYSTOLIC BLOOD PRESSURE: 119 MMHG | BODY MASS INDEX: 20.92 KG/M2 | HEIGHT: 68 IN | WEIGHT: 138 LBS | DIASTOLIC BLOOD PRESSURE: 78 MMHG | HEART RATE: 80 BPM

## 2024-07-17 DIAGNOSIS — Z09 ENCOUNTER FOR FOLLOW-UP EXAMINATION AFTER COMPLETED TREATMENT FOR CONDITIONS OTHER THAN MALIGNANT NEOPLASM: ICD-10-CM

## 2024-07-17 PROCEDURE — 99024 POSTOP FOLLOW-UP VISIT: CPT

## 2024-07-19 ENCOUNTER — APPOINTMENT (OUTPATIENT)
Dept: BREAST CENTER | Facility: CLINIC | Age: 44
End: 2024-07-19

## 2024-08-20 ENCOUNTER — APPOINTMENT (OUTPATIENT)
Dept: OBGYN | Facility: CLINIC | Age: 44
End: 2024-08-20
Payer: COMMERCIAL

## 2024-08-20 VITALS
BODY MASS INDEX: 22.28 KG/M2 | SYSTOLIC BLOOD PRESSURE: 122 MMHG | HEIGHT: 68 IN | DIASTOLIC BLOOD PRESSURE: 86 MMHG | WEIGHT: 147 LBS | HEART RATE: 114 BPM

## 2024-08-20 DIAGNOSIS — R06.02 SHORTNESS OF BREATH: ICD-10-CM

## 2024-08-20 DIAGNOSIS — Z09 ENCOUNTER FOR FOLLOW-UP EXAMINATION AFTER COMPLETED TREATMENT FOR CONDITIONS OTHER THAN MALIGNANT NEOPLASM: ICD-10-CM

## 2024-08-20 DIAGNOSIS — Z87.898 PERSONAL HISTORY OF OTHER SPECIFIED CONDITIONS: ICD-10-CM

## 2024-08-20 DIAGNOSIS — N63.20 UNSPECIFIED LUMP IN THE LEFT BREAST, UNSPECIFIED QUADRANT: ICD-10-CM

## 2024-08-20 DIAGNOSIS — Z98.890 OTHER SPECIFIED POSTPROCEDURAL STATES: ICD-10-CM

## 2024-08-20 DIAGNOSIS — R92.333 MAMMOGRAPHIC HETEROGENEOUS DENSITY, BILATERAL BREASTS: ICD-10-CM

## 2024-08-20 DIAGNOSIS — Z01.419 ENCOUNTER FOR GYNECOLOGICAL EXAMINATION (GENERAL) (ROUTINE) W/OUT ABNORMAL FINDINGS: ICD-10-CM

## 2024-08-20 PROCEDURE — 99396 PREV VISIT EST AGE 40-64: CPT

## 2024-08-20 PROCEDURE — 99459 PELVIC EXAMINATION: CPT

## 2024-08-20 NOTE — HISTORY OF PRESENT ILLNESS
[FreeTextEntry1] : Patient is a 43-year-old  3 para 3 last menstrual 2024 Patient presents for annual,, and renew her birth control pills Patient states that on occasion her cycles may last up to 10 to 12 days of spotting

## 2024-08-20 NOTE — PHYSICAL EXAM
[Chaperone Present] : A chaperone was present in the examining room during all aspects of the physical examination [[id number]] : A chaperone was present during the pelvic exam. [Appropriately responsive] : appropriately responsive [Alert] : alert [No Acute Distress] : no acute distress [No Lymphadenopathy] : no lymphadenopathy [Regular Rate Rhythm] : regular rate rhythm [No Murmurs] : no murmurs [Clear to Auscultation B/L] : clear to auscultation bilaterally [Soft] : soft [Non-tender] : non-tender [Non-distended] : non-distended [No HSM] : No HSM [No Lesions] : no lesions [No Mass] : no mass [Oriented x3] : oriented x3 [Examination Of The Breasts] : a normal appearance [No Masses] : no breast masses were palpable [Labia Majora] : normal [Labia Minora] : normal [Normal] : normal [Anteversion] : anteverted [Uterine Adnexae] : normal [FreeTextEntry2] : Ami [FreeTextEntry6] : No masses, nontender, no skin changes, no nipple discharge, no adenopathy. [Tenderness] : nontender [Mass ___ cm] : no uterine mass was palpated

## 2024-08-20 NOTE — PLAN
[FreeTextEntry1] : Patient is a 43-year-old  3 para 3 last menstrual period 2024 Patient presents for annual physical, and renewal of her birth control pills Physical exam reveals a well-developed well-nourished female no apparent distress,, BMI 22 Heart regular rhythm and rate, lungs clear, breast no mass nontender no skin change no nipple discharge no adenopathy, abdomen soft nontender no organomegaly Pelvic exam shows normal female external genitalia, vagina no lesions, cervix appropriate size nontender, uterus anteverted normal size nontender, adnexa no mass nontender Pap smear performed Patient had recent visit with her breast surgeon and postop visit after undergoing a breast biopsy and excisional biopsy on  of this year Patient will call back as to the recommendation from her breast region for a follow-up breast mammogram and sonogram within 6 months or sooner or later Essentially benign GYN exam Follow-up 6 months or prior to that as needed  Ami was present as a chaperone for the entire assessment and examination of this patient

## 2024-08-27 LAB — HPV HIGH+LOW RISK DNA PNL CVX: NOT DETECTED

## 2025-02-20 ENCOUNTER — APPOINTMENT (OUTPATIENT)
Dept: OBGYN | Facility: CLINIC | Age: 45
End: 2025-02-20

## 2025-04-11 ENCOUNTER — APPOINTMENT (OUTPATIENT)
Dept: OBGYN | Facility: CLINIC | Age: 45
End: 2025-04-11
Payer: COMMERCIAL

## 2025-04-11 VITALS
DIASTOLIC BLOOD PRESSURE: 77 MMHG | HEART RATE: 73 BPM | WEIGHT: 156 LBS | SYSTOLIC BLOOD PRESSURE: 130 MMHG | BODY MASS INDEX: 23.64 KG/M2 | HEIGHT: 68 IN

## 2025-04-11 DIAGNOSIS — Z12.4 ENCOUNTER FOR SCREENING FOR MALIGNANT NEOPLASM OF CERVIX: ICD-10-CM

## 2025-04-11 DIAGNOSIS — Z30.41 ENCOUNTER FOR SURVEILLANCE OF CONTRACEPTIVE PILLS: ICD-10-CM

## 2025-04-11 DIAGNOSIS — N93.9 ABNORMAL UTERINE AND VAGINAL BLEEDING, UNSPECIFIED: ICD-10-CM

## 2025-04-11 DIAGNOSIS — Z11.51 ENCOUNTER FOR SCREENING FOR HUMAN PAPILLOMAVIRUS (HPV): ICD-10-CM

## 2025-04-11 DIAGNOSIS — Z01.419 ENCOUNTER FOR GYNECOLOGICAL EXAMINATION (GENERAL) (ROUTINE) W/OUT ABNORMAL FINDINGS: ICD-10-CM

## 2025-04-11 DIAGNOSIS — Z98.890 OTHER SPECIFIED POSTPROCEDURAL STATES: ICD-10-CM

## 2025-04-11 DIAGNOSIS — Z12.39 ENCOUNTER FOR OTHER SCREENING FOR MALIGNANT NEOPLASM OF BREAST: ICD-10-CM

## 2025-04-11 LAB
HCG UR QL: NEGATIVE
QUALITY CONTROL: YES

## 2025-04-11 PROCEDURE — 36415 COLL VENOUS BLD VENIPUNCTURE: CPT

## 2025-04-11 PROCEDURE — 81025 URINE PREGNANCY TEST: CPT

## 2025-04-11 PROCEDURE — 99459 PELVIC EXAMINATION: CPT

## 2025-04-11 PROCEDURE — 99213 OFFICE O/P EST LOW 20 MIN: CPT

## 2025-04-15 ENCOUNTER — TRANSCRIPTION ENCOUNTER (OUTPATIENT)
Age: 45
End: 2025-04-15

## 2025-04-16 LAB
BASOPHILS # BLD AUTO: 0.06 K/UL
BASOPHILS NFR BLD AUTO: 0.7 %
EOSINOPHIL # BLD AUTO: 0.13 K/UL
EOSINOPHIL NFR BLD AUTO: 1.5 %
HCT VFR BLD CALC: 37 %
HGB BLD-MCNC: 12.7 G/DL
IMM GRANULOCYTES NFR BLD AUTO: 0.2 %
LYMPHOCYTES # BLD AUTO: 2.98 K/UL
LYMPHOCYTES NFR BLD AUTO: 34 %
MAN DIFF?: NORMAL
MCHC RBC-ENTMCNC: 32.3 PG
MCHC RBC-ENTMCNC: 34.3 G/DL
MCV RBC AUTO: 94.1 FL
MONOCYTES # BLD AUTO: 0.95 K/UL
MONOCYTES NFR BLD AUTO: 10.8 %
NEUTROPHILS # BLD AUTO: 4.62 K/UL
NEUTROPHILS NFR BLD AUTO: 52.8 %
PLATELET # BLD AUTO: 307 K/UL
RBC # BLD: 3.93 M/UL
RBC # FLD: 12.5 %
WBC # FLD AUTO: 8.76 K/UL

## 2025-04-21 ENCOUNTER — APPOINTMENT (OUTPATIENT)
Dept: OBGYN | Facility: CLINIC | Age: 45
End: 2025-04-21
Payer: COMMERCIAL

## 2025-04-21 ENCOUNTER — ASOB RESULT (OUTPATIENT)
Age: 45
End: 2025-04-21

## 2025-04-21 DIAGNOSIS — N92.0 EXCESSIVE AND FREQUENT MENSTRUATION WITH REGULAR CYCLE: ICD-10-CM

## 2025-04-21 PROCEDURE — 76830 TRANSVAGINAL US NON-OB: CPT

## 2025-04-21 PROCEDURE — 76856 US EXAM PELVIC COMPLETE: CPT | Mod: 59

## 2025-06-02 ENCOUNTER — APPOINTMENT (OUTPATIENT)
Dept: ORTHOPEDIC SURGERY | Facility: CLINIC | Age: 45
End: 2025-06-02
Payer: COMMERCIAL

## 2025-06-02 VITALS — BODY MASS INDEX: 21.22 KG/M2 | HEIGHT: 68 IN | WEIGHT: 140 LBS

## 2025-06-02 DIAGNOSIS — M41.125 ADOLESCENT IDIOPATHIC SCOLIOSIS, THORACOLUMBAR REGION: ICD-10-CM

## 2025-06-02 DIAGNOSIS — M54.16 RADICULOPATHY, LUMBAR REGION: ICD-10-CM

## 2025-06-02 PROCEDURE — 99203 OFFICE O/P NEW LOW 30 MIN: CPT

## 2025-06-02 PROCEDURE — 72082 X-RAY EXAM ENTIRE SPI 2/3 VW: CPT

## 2025-06-20 ENCOUNTER — NON-APPOINTMENT (OUTPATIENT)
Age: 45
End: 2025-06-20

## 2025-08-11 ENCOUNTER — RX RENEWAL (OUTPATIENT)
Age: 45
End: 2025-08-11

## 2025-08-21 ENCOUNTER — APPOINTMENT (OUTPATIENT)
Dept: OBGYN | Facility: CLINIC | Age: 45
End: 2025-08-21